# Patient Record
Sex: FEMALE | Race: BLACK OR AFRICAN AMERICAN | NOT HISPANIC OR LATINO | URBAN - METROPOLITAN AREA
[De-identification: names, ages, dates, MRNs, and addresses within clinical notes are randomized per-mention and may not be internally consistent; named-entity substitution may affect disease eponyms.]

---
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
## 2024-05-10 ENCOUNTER — TELEPHONE (OUTPATIENT)
Dept: FAMILY MEDICINE | Facility: CLINIC | Age: 19
End: 2024-05-10

## 2024-05-10 NOTE — TELEPHONE ENCOUNTER
"Care Coordinator attempted to reach out to patient, per . Patient would like form from \"[facility] [employer]\" completed, however her last office visit was in 2021. Patient will need an appointment before  can complete the form. CC left message on voicemail, along with direct number.       Mirna Vizcarra  Lead Care Coordinator  [facility]  [phone]   "

## 2024-05-13 NOTE — TELEPHONE ENCOUNTER
"5/13/24 Care Coordinator once again attempted to reach out to patient to inform that she will need an appointment before  can complete her \"[facility]\" form. CC left a detailed message on patient voicemail. Patient's Mother, Dwain is also aware. Patient is in [place] for [employer].      Mirna Vizcarra  Lead Care Coordinator  [facility]  [phone]    "

## 2024-08-13 ENCOUNTER — OFFICE VISIT (OUTPATIENT)
Dept: FAMILY MEDICINE | Facility: CLINIC | Age: 19
End: 2024-08-13
Payer: COMMERCIAL

## 2024-08-13 VITALS
WEIGHT: 158.4 LBS | HEIGHT: 65 IN | TEMPERATURE: 98.1 F | RESPIRATION RATE: 16 BRPM | OXYGEN SATURATION: 100 % | BODY MASS INDEX: 26.39 KG/M2 | SYSTOLIC BLOOD PRESSURE: 119 MMHG | DIASTOLIC BLOOD PRESSURE: 80 MMHG | HEART RATE: 72 BPM

## 2024-08-13 DIAGNOSIS — Z11.59 NEED FOR HEPATITIS C SCREENING TEST: ICD-10-CM

## 2024-08-13 DIAGNOSIS — Z11.4 SCREENING FOR HIV (HUMAN IMMUNODEFICIENCY VIRUS): ICD-10-CM

## 2024-08-13 DIAGNOSIS — F32.A DEPRESSION, UNSPECIFIED DEPRESSION TYPE: ICD-10-CM

## 2024-08-13 DIAGNOSIS — Z00.129 ENCOUNTER FOR ROUTINE CHILD HEALTH EXAMINATION WITHOUT ABNORMAL FINDINGS: Primary | ICD-10-CM

## 2024-08-13 DIAGNOSIS — Z88.9 MULTIPLE ALLERGIES: ICD-10-CM

## 2024-08-13 DIAGNOSIS — Z11.3 SCREENING FOR STDS (SEXUALLY TRANSMITTED DISEASES): ICD-10-CM

## 2024-08-13 PROCEDURE — 90471 IMMUNIZATION ADMIN: CPT

## 2024-08-13 PROCEDURE — 87389 HIV-1 AG W/HIV-1&-2 AB AG IA: CPT

## 2024-08-13 PROCEDURE — 99204 OFFICE O/P NEW MOD 45 MIN: CPT | Mod: 25

## 2024-08-13 PROCEDURE — 99385 PREV VISIT NEW AGE 18-39: CPT | Mod: 25

## 2024-08-13 PROCEDURE — 86803 HEPATITIS C AB TEST: CPT

## 2024-08-13 PROCEDURE — 36415 COLL VENOUS BLD VENIPUNCTURE: CPT

## 2024-08-13 PROCEDURE — 87591 N.GONORRHOEAE DNA AMP PROB: CPT

## 2024-08-13 PROCEDURE — 87491 CHLMYD TRACH DNA AMP PROBE: CPT

## 2024-08-13 PROCEDURE — 90715 TDAP VACCINE 7 YRS/> IM: CPT

## 2024-08-13 RX ORDER — ACETAMINOPHEN 325 MG/1
650 TABLET ORAL EVERY 6 HOURS PRN
Qty: 100 TABLET | Refills: 0 | Status: SHIPPED | OUTPATIENT
Start: 2024-08-13

## 2024-08-13 RX ORDER — LORATADINE 10 MG/1
10 TABLET ORAL DAILY
Qty: 30 TABLET | Refills: 1 | Status: SHIPPED | OUTPATIENT
Start: 2024-08-13 | End: 2024-09-20

## 2024-08-13 RX ORDER — EPINEPHRINE 0.3 MG/.3ML
0.3 INJECTION SUBCUTANEOUS
Qty: 0.6 ML | Refills: 3 | Status: SHIPPED | OUTPATIENT
Start: 2024-08-13

## 2024-08-13 RX ORDER — MULTIVITAMIN
1 TABLET ORAL DAILY
Qty: 30 TABLET | Refills: 3 | Status: SHIPPED | OUTPATIENT
Start: 2024-08-13

## 2024-08-13 SDOH — HEALTH STABILITY: PHYSICAL HEALTH: ON AVERAGE, HOW MANY DAYS PER WEEK DO YOU ENGAGE IN MODERATE TO STRENUOUS EXERCISE (LIKE A BRISK WALK)?: 3 DAYS

## 2024-08-13 ASSESSMENT — SOCIAL DETERMINANTS OF HEALTH (SDOH): HOW OFTEN DO YOU GET TOGETHER WITH FRIENDS OR RELATIVES?: TWICE A WEEK

## 2024-08-13 NOTE — PATIENT INSTRUCTIONS
Patient Education   Here is the plan from today's visit    1. Multiple allergies  - EPINEPHrine (ANY BX GENERIC EQUIV) 0.3 MG/0.3ML injection 2-pack; Inject 0.3 mLs (0.3 mg) into the muscle once as needed for anaphylaxis  Dispense: 0.6 mL; Refill: 3  - loratadine (CLARITIN) 10 MG tablet; Take 1 tablet (10 mg) by mouth daily  Dispense: 30 tablet; Refill: 1    2. Encounter for routine child health examination without abnormal findings [Z00.129]  - TDAP 10-64Y (ADACEL,BOOSTRIX)  - multivitamin (ONE-DAILY) tablet; Take 1 tablet by mouth daily  Dispense: 30 tablet; Refill: 3    3. Screening for STDs (sexually transmitted diseases)  - Chlamydia trachomatis/Neisseria gonorrhoeae by PCR - Clinic Collect    4. Screening for HIV (human immunodeficiency virus)  - HIV Screening; Future  - HIV Screening    5. Need for hepatitis C screening test  - Hepatitis C Screen Reflex to HCV RNA Quant and Genotype; Future  - Hepatitis C Screen Reflex to HCV RNA Quant and Genotype    6. Depression, unspecified depression type  - Adult Mental Health  Referral; Future          Please call or return to clinic if your symptoms don't go away.    Follow up plan  Return in about 1 year (around 8/13/2025) for Routine preventive.    Thank you for coming to [facility] today.  Lab Testing:  **If you had lab testing today and your results are reassuring or normal they will be mailed to you or sent through [employer] within 7 days.   **If the lab tests need quick action we will call you with the results.  **If you are having labs done on a different day, please call [phone] to schedule at [facility] or [phone] for other [facility] locations. Labs do not offer walk-in appointments.  The phone number we will call with results is # [phone] (home) . If this is not the best number please call our clinic and change the number.  Medication Refills:  If you need any refills please call your pharmacy and they will  contact us.   If you need to  your refill at a new pharmacy, please contact the new pharmacy directly. The new pharmacy will help you get your medications transferred faster.   Scheduling:  If you have any concerns about today's visit or wish to schedule another appointment please call our office during normal business hours [phone] (8-5:00 M-F). If you can no longer make a scheduled visit, please cancel via "[employer] or call us to cancel.   If a referral was made to an [facility] specialty provider and you do not get a call from central scheduling, please refer to directions on your visit summary or call our office during normal business hours for assistance.   If a Mammogram was ordered for you at the Breast Center call [phone] to schedule or change your appointment.  If you had an XRay/CT/Ultrasound/MRI ordered the number is [phone] to schedule or change your radiology appointment.   [facility] has limited ultrasound appointments available on Wednesdays, if you would like your ultrasound at [facility], please call [phone] to schedule.   Medical Concerns:  If you have urgent medical concerns please call [phone] at any time of the day.    Leighann Mancini MD

## 2024-08-13 NOTE — Clinical Note
Hi,  This patient is wanting help with finding a dentist in the area to schedule an appointment for when she is back from college on winter break this winter. Could you call her with resources for finding a dentist? Or can you let me know how I can get her the helpful information for finding a dentist? Thank you!

## 2024-08-13 NOTE — PROGRESS NOTES
"Preventive Care Visit  [facility] FEDERICA Mancini MD, Family Medicine  Aug 13, 2024      Assessment & Plan     Multiple allergies  Has been having difficulty with her school not keeping from gluten contamination in the cafeteria. Letter provided today for support of gluten-free products and minimizing contamination.  - EPINEPHrine (ANY BX GENERIC EQUIV) 0.3 MG/0.3ML injection 2-pack; Inject 0.3 mLs (0.3 mg) into the muscle once as needed for anaphylaxis  - loratadine (CLARITIN) 10 MG tablet; Take 1 tablet (10 mg) by mouth daily    Encounter for routine child health examination without abnormal findings [Z00.129]  - TDAP 10-64Y (ADACEL,BOOSTRIX)  - multivitamin (ONE-DAILY) tablet; Take 1 tablet by mouth daily  - acetaminophen (TYLENOL) 325 MG tablet; Take 2 tablets (650 mg) by mouth every 6 hours as needed for mild pain    Screening for STDs (sexually transmitted diseases)  Patient reports being sexually active without using barrier protection consistently.  She is asymptomatic.  Discussed safe sex practices.  - Chlamydia trachomatis/Neisseria gonorrhoeae by PCR - Clinic Collect    Screening for HIV (human immunodeficiency virus)  - HIV Screening; Future    Need for hepatitis C screening test  - Hepatitis C Screen Reflex to HCV RNA Quant and Genotype; Future    Depression, unspecified depression type  PHQ-9 with score of 10 today consistent with moderate depressive symptoms.  Patient has tried getting therapy through her college in [place] without success.  She would like options for therapy near her school.  - Adult Mental Health  Referral; Future    Patient has been advised of split billing requirements and indicates understanding: No        BMI  Estimated body mass index is 26.44 kg/m  as calculated from the following:    Height as of this encounter: 1.648 m (5' 4.9\").    Weight as of this encounter: 71.8 kg (158 lb 6.4 oz).     Counseling  Appropriate preventive services were " addressed with this patient via screening, questionnaire, or discussion as appropriate for fall prevention, nutrition, physical activity, Tobacco-use cessation, weight loss and cognition.  Checklist reviewing preventive services available has been given to the patient.  Reviewed patient's diet, addressing concerns and/or questions.   She is at risk for lack of exercise and has been provided with information to increase physical activity for the benefit of her well-being.   The patient was instructed to see the dentist every 6 months.     Return in about 1 year (around 8/13/2025) for Routine preventive.    Loretta Cummins is a 19 year old, presenting for the following:  Physical (Note for school, therapy referral)        8/13/2024     1:59 PM   Additional Questions   Roomed by Nika   Accompanied by self         8/13/2024   Declines Weight   Did patient decline having their weight taken? Yes          8/13/2024    Information    services provided? No           Health Care Directive  Patient does not have a Health Care Directive or Living Will    HPI  Mental health:  -feels lots of ups and downs in mood, reactive to situations occurring at school  -has ups and downs with wanting to socialize with others  -has tried finding therapists through her school but would prefer to get a therapist in the community instead  PATIENT HEALTH QUESTIONNAIRE-9 (PHQ - 9)    Over the last 2 weeks, how often have you been bothered by any of the following problems?    1. Little interest or pleasure in doing things -   0   2. Feeling down, depressed, or hopeless -   1   3. Trouble falling or staying asleep, or sleeping too much -  2   4. Feeling tired or having little energy -   1   5. Poor appetite or overeating -   1   6. Feeling bad about yourself - or that you are a failure or have let yourself or your family down -   2   7. Trouble concentrating on things, such as reading the newspaper or watching television  -  1   8. Moving or speaking so slowly that other people could have noticed? Or the opposite - being so fidgety or restless that you have been moving around a lot more than usual  2   9. Thoughts that you would be better off dead or of hurting  yourself in some way  0   Total Score:  10     If you checked off any problems, how difficult have these problems made it for you to do your work, take care of things at home, or get along with other people?      Developed by Vishnu Dow, Binta Perrin, Wei Mendoza and colleagues, with an educational tres from [employer] No permission required to reproduce, translate, display or distribute. permission required to reproduce, translate, display or distribute.      Gluten allergy:  -would like note to ensure the cafeteria is being cautious with gluten contamination and is requesting a letter  -states there has been problems with gluten contaminating all the food in the cafeteria at her college    Possible STI exposure  Patient reports being sexually active with new partner.  She does not use barrier protection consistently.  She does not have any vaginal itching, pain, abnormal discharge.          8/13/2024   General Health   How would you rate your overall physical health? Good   Feel stress (tense, anxious, or unable to sleep) Rather much      (!) STRESS CONCERN      8/13/2024   Nutrition   Three or more servings of calcium each day? (!) NO   Diet: Gluten-free/reduced   How many servings of fruit and vegetables per day? (!) 2-3   How many sweetened beverages each day? (!) 2            8/13/2024   Exercise   Days per week of moderate/strenous exercise 3 days            8/13/2024   Social Factors   Frequency of gathering with friends or relatives Twice a week   Worry food won't last until get money to buy more No   Food not last or not have enough money for food? No   Do you have housing? (Housing is defined as stable permanent housing and does not include  "staying ouside in a car, in a tent, in an abandoned building, in an overnight shelter, or couch-surfing.) Yes   Are you worried about losing your housing? No   Lack of transportation? No   Unable to get utilities (heat,electricity)? No            8/13/2024   Dental   Dentist two times every year? (!) NO            8/13/2024   TB Screening   Were you born outside of the [place]? No            Today's PHQ-2 Score:       8/13/2024     1:47 PM   PHQ-2 ( 1999 [employer])   Q1: Little interest or pleasure in doing things 0   Q2: Feeling down, depressed or hopeless 0   PHQ-2 Score 0   Q1: Little interest or pleasure in doing things Not at all   Q2: Feeling down, depressed or hopeless Not at all   PHQ-2 Score 0           8/13/2024   Substance Use   Alcohol more than 3/day or more than 7/wk No   Do you use any other substances recreationally? No        Social History     Tobacco Use    Smoking status: Never    Smokeless tobacco: Never             8/13/2024   One time HIV Screening   Previous HIV test? No          8/13/2024   STI Screening   New sexual partner(s) since last STI/HIV test? (!) DECLINE        Has been sexually active and not consistently using barrier protection. Requesting screening for chlamydia and gonorrhea. Is asymptomatic.             8/13/2024   Contraception/Family Planning   Questions about contraception or family planning No         Objective    Exam  /80   Pulse 72   Temp 98.1  F (36.7  C) (Oral)   Resp 16   Ht 1.648 m (5' 4.9\")   Wt 71.8 kg (158 lb 6.4 oz)   LMP 07/18/2024 (Exact Date)   SpO2 100%   BMI 26.44 kg/m     Estimated body mass index is 26.44 kg/m  as calculated from the following:    Height as of this encounter: 1.648 m (5' 4.9\").    Weight as of this encounter: 71.8 kg (158 lb 6.4 oz).    Physical Exam  GENERAL: alert and no distress  NECK: no adenopathy, no asymmetry, masses, or scars  RESP: lungs clear to auscultation - no rales, rhonchi or wheezes  CV: regular rate and rhythm, " normal S1 S2, no S3 or S4, no murmur, click or rub, no peripheral edema  ABDOMEN: soft, nontender, no hepatosplenomegaly, no masses and bowel sounds normal  MS: no gross musculoskeletal defects noted, no edema  : Exam declined by parent/patient.  Reason for decline: Patient/Parental preference      Vision Screen  Vision Screen Details  Reason Vision Screen Not Completed: Parent/Patient declined - No concerns    Hearing Screen  Hearing Screen Not Completed  Reason Hearing Screen was not completed: Parent declined - No concerns        Signed Electronically by: Leighann Mancini MD

## 2024-08-13 NOTE — LETTER
August 13, 2024      Placido Abraham  [address]  [facility] [place] [address]              To Whom It May Concern:    Placido Abraham was seen in [facility] on 8/13/24. She has a gluten allergy requiring medical management. I am requesting that the cafeteria maintain gluten-free options and make all efforts possible to keep from gluten contamination of gluten-free products. Thank you. Please call with any questions or concerns.      Sincerely,      Leighann Mancini MD

## 2024-08-13 NOTE — PROGRESS NOTES
Preceptor Attestation:   Patient seen, evaluated and discussed with the resident. I have verified the content of the note, which accurately reflects my assessment of the patient and the plan of care.   Supervising Physician:  Nela Villarreal MD

## 2024-08-14 LAB
C TRACH DNA SPEC QL PROBE+SIG AMP: NEGATIVE
HCV AB SERPL QL IA: NONREACTIVE
HIV 1+2 AB+HIV1 P24 AG SERPL QL IA: NONREACTIVE
N GONORRHOEA DNA SPEC QL NAA+PROBE: NEGATIVE

## 2024-09-01 ENCOUNTER — HEALTH MAINTENANCE LETTER (OUTPATIENT)
Age: 19
End: 2024-09-01

## 2024-09-19 DIAGNOSIS — Z88.9 MULTIPLE ALLERGIES: ICD-10-CM

## 2024-09-19 NOTE — TELEPHONE ENCOUNTER
"Request for medication refill:  loratadine (CLARITIN) 10 MG tablet       Providers if patient needs an appointment and you are willing to give a one month supply please refill for one month and  send a letter/MyChart using \".SMILLIMITEDREFILL\" .smillimited and route chart to \"[facility] \" (Giving one month refill in non controlled medications is strongly recommended before denial)    If refill has been denied, meaning absolutely no refills without visit, please complete the smart phrase \".smirxrefuse\" and route it to the \"[facility] MED REFILLS\"  pool to inform the patient and the pharmacy.    Hilario Anderson MA      "

## 2024-09-20 RX ORDER — LORATADINE 10 MG/1
10 TABLET ORAL DAILY
Qty: 30 TABLET | Refills: 1 | Status: SHIPPED | OUTPATIENT
Start: 2024-09-20

## 2024-11-14 DIAGNOSIS — Z88.9 MULTIPLE ALLERGIES: ICD-10-CM

## 2024-11-14 RX ORDER — LORATADINE 10 MG/1
10 TABLET ORAL DAILY
Qty: 30 TABLET | Refills: 5 | Status: SHIPPED | OUTPATIENT
Start: 2024-11-14

## 2024-11-14 NOTE — TELEPHONE ENCOUNTER
"Request for medication refill:  loratadine (CLARITIN) 10 MG tablet     Providers if patient needs an appointment and you are willing to give a one month supply please refill for one month and  send a letter/MyChart using \".SMILLIMITEDREFILL\" .smillimited and route chart to \"[facility] \" (Giving one month refill in non controlled medications is strongly recommended before denial)    If refill has been denied, meaning absolutely no refills without visit, please complete the smart phrase \".smirxrefuse\" and route it to the \"[facility] MED REFILLS\"  pool to inform the patient and the pharmacy.    Diana Yanez, [facility]      "